# Patient Record
Sex: FEMALE | Race: WHITE | NOT HISPANIC OR LATINO
[De-identification: names, ages, dates, MRNs, and addresses within clinical notes are randomized per-mention and may not be internally consistent; named-entity substitution may affect disease eponyms.]

---

## 2017-01-09 PROBLEM — Z00.00 ENCOUNTER FOR PREVENTIVE HEALTH EXAMINATION: Status: ACTIVE | Noted: 2017-01-09

## 2017-02-07 ENCOUNTER — APPOINTMENT (OUTPATIENT)
Dept: NEUROLOGY | Facility: CLINIC | Age: 27
End: 2017-02-07

## 2017-02-07 VITALS
BODY MASS INDEX: 38.64 KG/M2 | SYSTOLIC BLOOD PRESSURE: 114 MMHG | HEIGHT: 62 IN | DIASTOLIC BLOOD PRESSURE: 79 MMHG | WEIGHT: 210 LBS | HEART RATE: 98 BPM

## 2017-02-07 DIAGNOSIS — Z86.69 PERSONAL HISTORY OF OTHER DISEASES OF THE NERVOUS SYSTEM AND SENSE ORGANS: ICD-10-CM

## 2017-02-07 DIAGNOSIS — Z87.39 PERSONAL HISTORY OF OTHER DISEASES OF THE MUSCULOSKELETAL SYSTEM AND CONNECTIVE TISSUE: ICD-10-CM

## 2017-02-07 DIAGNOSIS — E34.8 OTHER SPECIFIED ENDOCRINE DISORDERS: ICD-10-CM

## 2017-09-29 ENCOUNTER — APPOINTMENT (OUTPATIENT)
Dept: NEUROLOGY | Facility: CLINIC | Age: 27
End: 2017-09-29
Payer: COMMERCIAL

## 2017-09-29 VITALS
HEART RATE: 96 BPM | BODY MASS INDEX: 38.64 KG/M2 | SYSTOLIC BLOOD PRESSURE: 126 MMHG | DIASTOLIC BLOOD PRESSURE: 80 MMHG | WEIGHT: 210 LBS | HEIGHT: 62 IN

## 2017-09-29 DIAGNOSIS — R41.3 OTHER AMNESIA: ICD-10-CM

## 2017-09-29 PROCEDURE — 99214 OFFICE O/P EST MOD 30 MIN: CPT

## 2017-09-29 RX ORDER — ALBUTEROL SULFATE 90 UG/1
108 (90 BASE) AEROSOL, METERED RESPIRATORY (INHALATION)
Qty: 18 | Refills: 0 | Status: DISCONTINUED | COMMUNITY
Start: 2017-06-12

## 2017-09-29 RX ORDER — ALPRAZOLAM 0.5 MG/1
0.5 TABLET ORAL
Qty: 30 | Refills: 0 | Status: DISCONTINUED | COMMUNITY
Start: 2017-07-11

## 2017-09-29 RX ORDER — AMOXICILLIN AND CLAVULANATE POTASSIUM 875; 125 MG/1; MG/1
875-125 TABLET, COATED ORAL
Qty: 14 | Refills: 0 | Status: DISCONTINUED | COMMUNITY
Start: 2017-06-12

## 2017-09-29 RX ORDER — FLUTICASONE PROPIONATE 50 UG/1
50 SPRAY, METERED NASAL
Qty: 16 | Refills: 0 | Status: DISCONTINUED | COMMUNITY
Start: 2017-06-12

## 2017-09-29 RX ORDER — DICLOFENAC SODIUM 50 MG/1
50 TABLET, DELAYED RELEASE ORAL
Qty: 60 | Refills: 0 | Status: DISCONTINUED | COMMUNITY
Start: 2016-12-02

## 2017-09-29 RX ORDER — DOXYCYCLINE 100 MG/1
100 CAPSULE ORAL
Qty: 14 | Refills: 0 | Status: DISCONTINUED | COMMUNITY
Start: 2017-06-20

## 2017-09-29 RX ORDER — PREDNISONE 20 MG/1
20 TABLET ORAL
Qty: 10 | Refills: 0 | Status: DISCONTINUED | COMMUNITY
Start: 2017-06-12

## 2017-09-29 RX ORDER — AMOXICILLIN 875 MG/1
875 TABLET, FILM COATED ORAL
Qty: 20 | Refills: 0 | Status: DISCONTINUED | COMMUNITY
Start: 2017-09-07

## 2017-09-29 RX ORDER — BUDESONIDE 180 UG/1
180 AEROSOL, POWDER RESPIRATORY (INHALATION)
Qty: 1 | Refills: 0 | Status: DISCONTINUED | COMMUNITY
Start: 2017-06-20

## 2017-09-29 RX ORDER — ESCITALOPRAM OXALATE 20 MG/1
20 TABLET ORAL
Qty: 45 | Refills: 0 | Status: DISCONTINUED | COMMUNITY
Start: 2017-07-11

## 2017-11-03 ENCOUNTER — RX RENEWAL (OUTPATIENT)
Age: 27
End: 2017-11-03

## 2017-11-10 ENCOUNTER — APPOINTMENT (OUTPATIENT)
Dept: NEUROLOGY | Facility: CLINIC | Age: 27
End: 2017-11-10

## 2018-08-23 ENCOUNTER — TRANSCRIPTION ENCOUNTER (OUTPATIENT)
Age: 28
End: 2018-08-23

## 2018-08-23 ENCOUNTER — APPOINTMENT (OUTPATIENT)
Dept: NEUROLOGY | Facility: CLINIC | Age: 28
End: 2018-08-23
Payer: COMMERCIAL

## 2018-08-23 VITALS
DIASTOLIC BLOOD PRESSURE: 77 MMHG | WEIGHT: 175 LBS | SYSTOLIC BLOOD PRESSURE: 121 MMHG | BODY MASS INDEX: 32.2 KG/M2 | HEIGHT: 62 IN | HEART RATE: 93 BPM

## 2018-08-23 DIAGNOSIS — M54.12 RADICULOPATHY, CERVICAL REGION: ICD-10-CM

## 2018-08-23 DIAGNOSIS — R20.0 ANESTHESIA OF SKIN: ICD-10-CM

## 2018-08-23 PROCEDURE — 99215 OFFICE O/P EST HI 40 MIN: CPT

## 2018-09-25 ENCOUNTER — APPOINTMENT (OUTPATIENT)
Dept: NEUROLOGY | Facility: CLINIC | Age: 28
End: 2018-09-25
Payer: COMMERCIAL

## 2018-09-25 VITALS
SYSTOLIC BLOOD PRESSURE: 122 MMHG | BODY MASS INDEX: 33.61 KG/M2 | DIASTOLIC BLOOD PRESSURE: 77 MMHG | RESPIRATION RATE: 16 BRPM | HEIGHT: 61 IN | WEIGHT: 178 LBS | OXYGEN SATURATION: 97 % | HEART RATE: 98 BPM

## 2018-09-25 PROCEDURE — 99215 OFFICE O/P EST HI 40 MIN: CPT

## 2018-09-25 RX ORDER — TOPIRAMATE 25 MG/1
25 TABLET, FILM COATED ORAL
Qty: 60 | Refills: 0 | Status: DISCONTINUED | COMMUNITY
Start: 2017-09-29 | End: 2018-09-25

## 2018-09-25 RX ORDER — TOPIRAMATE 25 MG/1
25 TABLET, FILM COATED ORAL
Qty: 30 | Refills: 2 | Status: DISCONTINUED | COMMUNITY
Start: 2017-02-07 | End: 2018-09-25

## 2018-09-25 RX ORDER — ALPRAZOLAM 2 MG/1
TABLET ORAL
Refills: 0 | Status: DISCONTINUED | COMMUNITY
End: 2018-09-25

## 2018-09-25 RX ORDER — ESCITALOPRAM OXALATE 10 MG/1
10 TABLET, FILM COATED ORAL
Refills: 0 | Status: DISCONTINUED | COMMUNITY
End: 2018-09-25

## 2018-09-25 RX ORDER — TRAZODONE HYDROCHLORIDE 100 MG/1
100 TABLET ORAL
Qty: 30 | Refills: 0 | Status: DISCONTINUED | COMMUNITY
Start: 2017-07-11 | End: 2018-09-25

## 2018-09-25 RX ORDER — RIZATRIPTAN BENZOATE 10 MG/1
10 TABLET ORAL
Qty: 10 | Refills: 3 | Status: DISCONTINUED | COMMUNITY
Start: 2018-08-23 | End: 2018-09-25

## 2018-09-25 RX ORDER — NORETHINDRONE ACETATE AND ETHINYL ESTRADIOL AND FERROUS FUMARATE 1.5-30(21)
KIT ORAL
Refills: 0 | Status: DISCONTINUED | COMMUNITY
End: 2018-09-25

## 2018-10-09 ENCOUNTER — OUTPATIENT (OUTPATIENT)
Dept: OUTPATIENT SERVICES | Facility: HOSPITAL | Age: 28
LOS: 1 days | End: 2018-10-09
Payer: COMMERCIAL

## 2018-10-09 VITALS
SYSTOLIC BLOOD PRESSURE: 111 MMHG | HEART RATE: 91 BPM | OXYGEN SATURATION: 97 % | RESPIRATION RATE: 16 BRPM | DIASTOLIC BLOOD PRESSURE: 73 MMHG | HEIGHT: 61 IN | WEIGHT: 223.99 LBS | TEMPERATURE: 98 F

## 2018-10-09 DIAGNOSIS — I67.1 CEREBRAL ANEURYSM, NONRUPTURED: ICD-10-CM

## 2018-10-09 DIAGNOSIS — Z98.890 OTHER SPECIFIED POSTPROCEDURAL STATES: Chronic | ICD-10-CM

## 2018-10-09 DIAGNOSIS — Z01.818 ENCOUNTER FOR OTHER PREPROCEDURAL EXAMINATION: ICD-10-CM

## 2018-10-09 LAB
ANION GAP SERPL CALC-SCNC: 13 MMOL/L — SIGNIFICANT CHANGE UP (ref 5–17)
BLD GP AB SCN SERPL QL: NEGATIVE — SIGNIFICANT CHANGE UP
BUN SERPL-MCNC: 10 MG/DL — SIGNIFICANT CHANGE UP (ref 7–23)
CALCIUM SERPL-MCNC: 9.1 MG/DL — SIGNIFICANT CHANGE UP (ref 8.4–10.5)
CHLORIDE SERPL-SCNC: 106 MMOL/L — SIGNIFICANT CHANGE UP (ref 96–108)
CO2 SERPL-SCNC: 20 MMOL/L — LOW (ref 22–31)
CREAT SERPL-MCNC: 0.64 MG/DL — SIGNIFICANT CHANGE UP (ref 0.5–1.3)
GLUCOSE SERPL-MCNC: 93 MG/DL — SIGNIFICANT CHANGE UP (ref 70–99)
HCT VFR BLD CALC: 41.4 % — SIGNIFICANT CHANGE UP (ref 34.5–45)
HGB BLD-MCNC: 13.9 G/DL — SIGNIFICANT CHANGE UP (ref 11.5–15.5)
MCHC RBC-ENTMCNC: 30 PG — SIGNIFICANT CHANGE UP (ref 27–34)
MCHC RBC-ENTMCNC: 33.6 GM/DL — SIGNIFICANT CHANGE UP (ref 32–36)
MCV RBC AUTO: 89.4 FL — SIGNIFICANT CHANGE UP (ref 80–100)
PLATELET # BLD AUTO: 330 K/UL — SIGNIFICANT CHANGE UP (ref 150–400)
POTASSIUM SERPL-MCNC: 3.7 MMOL/L — SIGNIFICANT CHANGE UP (ref 3.5–5.3)
POTASSIUM SERPL-SCNC: 3.7 MMOL/L — SIGNIFICANT CHANGE UP (ref 3.5–5.3)
RBC # BLD: 4.63 M/UL — SIGNIFICANT CHANGE UP (ref 3.8–5.2)
RBC # FLD: 12.7 % — SIGNIFICANT CHANGE UP (ref 10.3–14.5)
RH IG SCN BLD-IMP: POSITIVE — SIGNIFICANT CHANGE UP
SODIUM SERPL-SCNC: 139 MMOL/L — SIGNIFICANT CHANGE UP (ref 135–145)
WBC # BLD: 8.16 K/UL — SIGNIFICANT CHANGE UP (ref 3.8–10.5)
WBC # FLD AUTO: 8.16 K/UL — SIGNIFICANT CHANGE UP (ref 3.8–10.5)

## 2018-10-09 PROCEDURE — 80048 BASIC METABOLIC PNL TOTAL CA: CPT

## 2018-10-09 PROCEDURE — 86850 RBC ANTIBODY SCREEN: CPT

## 2018-10-09 PROCEDURE — 85027 COMPLETE CBC AUTOMATED: CPT

## 2018-10-09 PROCEDURE — G0463: CPT

## 2018-10-09 PROCEDURE — 86901 BLOOD TYPING SEROLOGIC RH(D): CPT

## 2018-10-09 PROCEDURE — 86900 BLOOD TYPING SEROLOGIC ABO: CPT

## 2018-10-09 RX ORDER — RIZATRIPTAN BENZOATE 5 MG/1
1 TABLET ORAL
Qty: 0 | Refills: 0 | COMMUNITY

## 2018-10-09 RX ORDER — TOPIRAMATE 25 MG
1 TABLET ORAL
Qty: 0 | Refills: 0 | COMMUNITY

## 2018-10-09 NOTE — H&P PST ADULT - PMH
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

## 2018-10-09 NOTE — H&P PST ADULT - PSH
History of strabismus surgery  s/p Multiple eye surgeries: age 1 to age 17 ( secondary to congenital  4th Nerve palsy)  S/P rotator cuff repair  ' 2007  Left

## 2018-10-09 NOTE — H&P PST ADULT - HISTORY OF PRESENT ILLNESS
This is a 29 y/o female with PMH: Morbid obesity: BMI 42.3, Migraine, dx: ('15): Chiari 1 Malformation: No surgery, dx: ('16): Cerbral Aneurysm: No surgery. Routine MRI/MRA ( 8/2018): shows change. Now scheduled: This is a 27 y/o female with PMH: Morbid obesity: BMI 42.3, Migraine, dx: ('15): Chiari 1 Malformation: No surgery, dx: ('16): Cerbral Aneurysm: No surgery. Routine MRI/MRA ( 8/2018): shows change. Now scheduled: Cerebral Angiogram.

## 2018-10-17 ENCOUNTER — FORM ENCOUNTER (OUTPATIENT)
Age: 28
End: 2018-10-17

## 2018-10-18 ENCOUNTER — APPOINTMENT (OUTPATIENT)
Dept: NEUROLOGY | Facility: CLINIC | Age: 28
End: 2018-10-18

## 2018-10-18 ENCOUNTER — OUTPATIENT (OUTPATIENT)
Dept: OUTPATIENT SERVICES | Facility: HOSPITAL | Age: 28
LOS: 1 days | End: 2018-10-18
Payer: COMMERCIAL

## 2018-10-18 DIAGNOSIS — Z98.890 OTHER SPECIFIED POSTPROCEDURAL STATES: Chronic | ICD-10-CM

## 2018-10-18 DIAGNOSIS — I67.1 CEREBRAL ANEURYSM, NONRUPTURED: ICD-10-CM

## 2018-10-18 PROCEDURE — C1769: CPT

## 2018-10-18 PROCEDURE — 36224 PLACE CATH CAROTD ART: CPT

## 2018-10-18 PROCEDURE — 36226 PLACE CATH VERTEBRAL ART: CPT | Mod: 50

## 2018-10-18 PROCEDURE — 76377 3D RENDER W/INTRP POSTPROCES: CPT | Mod: 26

## 2018-10-18 PROCEDURE — 36223 PLACE CATH CAROTID/INOM ART: CPT | Mod: 59

## 2018-10-18 PROCEDURE — 36223 PLACE CATH CAROTID/INOM ART: CPT | Mod: 59,LT

## 2018-10-18 PROCEDURE — 36224 PLACE CATH CAROTD ART: CPT | Mod: RT

## 2018-10-18 PROCEDURE — C1894: CPT

## 2018-10-18 PROCEDURE — 86900 BLOOD TYPING SEROLOGIC ABO: CPT

## 2018-10-18 PROCEDURE — 36226 PLACE CATH VERTEBRAL ART: CPT

## 2018-10-18 PROCEDURE — C1887: CPT

## 2018-10-18 PROCEDURE — 76377 3D RENDER W/INTRP POSTPROCES: CPT

## 2018-10-18 PROCEDURE — 86901 BLOOD TYPING SEROLOGIC RH(D): CPT

## 2018-10-18 RX ORDER — SODIUM CHLORIDE 9 MG/ML
1000 INJECTION INTRAMUSCULAR; INTRAVENOUS; SUBCUTANEOUS
Qty: 0 | Refills: 0 | Status: DISCONTINUED | OUTPATIENT
Start: 2018-10-18 | End: 2018-11-02

## 2018-10-18 NOTE — CHART NOTE - NSCHARTNOTEFT_GEN_A_CORE
Interventional Neuro- Radiology   Procedure Note    Procedure: Selective Cerebral Angiography   Pre- Procedure Diagnosis: family history of aneurysm  Post- Procedure Diagnosis: ACOM region aneurysm    : Dr. Ronal Verdugo  Fellow: Dr. Nella Estrada  Resident: Dr. John Acosta    RN: Sowmya Shetty  RT: Kacie Singh    Anesthesia: (MAC)   (general anesthesia) Debra Gonzalez MD    Sheath: 4F    Preliminary Report:    Using a 4 Fr short sheath to the right groin under MAC sedation via left vertebral artery, left common carotid artery, left external carotid artery, right vertebral artery, right common carotid artery, right external carotid artery a selective cerebral angiography was performed and  demonstrates 1mm ACOM region aneurysm. (Official note to follow).  Patient tolerated procedure well, hemodynamically stable, no change in neurological status compared to baseline. Results discussed with neurosurgery, patient and patient's family. Groin sheath was removed,  manual compression held to hemostasis  for 11 minutes, no active bleeding, no hematoma, quick clot and safeguard balloon dressing applied at 1136h. Patient transferred to Recovery Room Interventional Neuro- Radiology   Procedure Note    Procedure: Selective Cerebral Angiography   Pre- Procedure Diagnosis: family history of aneurysm  Post- Procedure Diagnosis: ACOM region aneurysm    : Dr. Ronal Verdugo  Fellow: Dr. Nella Estrada  Resident: Dr. John Acosta    RN: Sowmya Shetty  RT: Kacie Singh    Anesthesia: (MAC)   (general anesthesia) Debra Gonzalez MD    Sheath: 4F    Preliminary Report:    Using a 4 Fr short sheath to the right groin under MAC sedation via left vertebral artery, left common carotid artery, left external carotid artery, right vertebral artery, right common carotid artery, right external carotid artery a selective cerebral angiography was performed and  demonstrates 1mm ACOM region aneurysm vs infindibulum. (Official note to follow).  Patient tolerated procedure well, hemodynamically stable, no change in neurological status compared to baseline. Results discussed with neurosurgery, patient and patient's family. Groin sheath was removed,  manual compression held to hemostasis  for 11 minutes, no active bleeding, no hematoma, quick clot and safeguard balloon dressing applied at 1136h. Patient transferred to Recovery Room Interventional Neuro- Radiology   Procedure Note    Procedure: Selective Cerebral Angiography   Pre- Procedure Diagnosis: family history of aneurysm  Post- Procedure Diagnosis: very small 1 mm ACOM aneurysm; possible infundibulum.    : Dr. Ronal Verdugo  Fellow: Dr. Nella Estrada  Resident: Dr. John Acosta    RN: Sowmya Shetty  RT: Kacie Singh    Anesthesia: (MAC)   (general anesthesia) Debra Gonzalez MD    Sheath: 4F    Preliminary Report:    Using a 4 Fr short sheath to the right groin under MAC sedation via left vertebral artery, left common carotid artery,, right vertebral artery, right common/internal carotid artery a selective cerebral angiography was performed and  demonstrates 1mm ACOM region aneurysm vs infindibulum. (Official note to follow).  Patient tolerated procedure well, hemodynamically stable, no change in neurological status compared to baseline. Results discussed with neurosurgery, patient and patient's family. Groin sheath was removed,  manual compression held to hemostasis  for 11 minutes, no active bleeding, no hematoma, quick clot and safeguard balloon dressing applied at 1136h. Patient transferred to Recovery Room

## 2018-10-22 ENCOUNTER — APPOINTMENT (OUTPATIENT)
Dept: NEUROLOGY | Facility: CLINIC | Age: 28
End: 2018-10-22
Payer: COMMERCIAL

## 2018-10-22 PROBLEM — G58.9 MONONEUROPATHY, UNSPECIFIED: Chronic | Status: ACTIVE | Noted: 2018-10-09

## 2018-10-22 PROBLEM — H50.9 UNSPECIFIED STRABISMUS: Chronic | Status: ACTIVE | Noted: 2018-10-09

## 2018-10-22 PROBLEM — M43.02 SPONDYLOLYSIS, CERVICAL REGION: Chronic | Status: ACTIVE | Noted: 2018-10-09

## 2018-10-22 PROBLEM — I67.1 CEREBRAL ANEURYSM, NONRUPTURED: Chronic | Status: ACTIVE | Noted: 2018-10-09

## 2018-10-22 PROBLEM — M75.102 UNSPECIFIED ROTATOR CUFF TEAR OR RUPTURE OF LEFT SHOULDER, NOT SPECIFIED AS TRAUMATIC: Chronic | Status: ACTIVE | Noted: 2018-10-09

## 2018-10-22 PROBLEM — G43.909 MIGRAINE, UNSPECIFIED, NOT INTRACTABLE, WITHOUT STATUS MIGRAINOSUS: Chronic | Status: ACTIVE | Noted: 2018-10-09

## 2018-10-22 PROBLEM — G93.5 COMPRESSION OF BRAIN: Chronic | Status: ACTIVE | Noted: 2018-10-09

## 2018-10-22 PROBLEM — R01.1 CARDIAC MURMUR, UNSPECIFIED: Chronic | Status: ACTIVE | Noted: 2018-10-09

## 2018-10-22 PROCEDURE — 95886 MUSC TEST DONE W/N TEST COMP: CPT

## 2018-10-22 PROCEDURE — 95910 NRV CNDJ TEST 7-8 STUDIES: CPT

## 2018-10-23 DIAGNOSIS — I67.9 CEREBROVASCULAR DISEASE, UNSPECIFIED: ICD-10-CM

## 2019-01-22 ENCOUNTER — APPOINTMENT (OUTPATIENT)
Dept: NEUROLOGY | Facility: CLINIC | Age: 29
End: 2019-01-22

## 2019-03-04 PROBLEM — R41.3 MEMORY LOSS: Status: ACTIVE | Noted: 2017-09-29

## 2019-11-27 ENCOUNTER — APPOINTMENT (OUTPATIENT)
Dept: NEUROLOGY | Facility: CLINIC | Age: 29
End: 2019-11-27
Payer: COMMERCIAL

## 2019-11-27 VITALS
SYSTOLIC BLOOD PRESSURE: 122 MMHG | BODY MASS INDEX: 39.65 KG/M2 | HEIGHT: 61 IN | HEART RATE: 88 BPM | WEIGHT: 210 LBS | DIASTOLIC BLOOD PRESSURE: 72 MMHG

## 2019-11-27 PROCEDURE — 99214 OFFICE O/P EST MOD 30 MIN: CPT

## 2019-11-28 NOTE — HISTORY OF PRESENT ILLNESS
[FreeTextEntry1] : 28 yo woman with chronic migraine headaches, Type 1 Chiari malformation, AComm aneurysm, and cervical spondylosis.\par \par She was taking Topamax which was helpful, however, she stopped taking it because she is trying to conceive.  Now her headaches have been chronic daily migrainous headaches.  She prefers to avoid daily medication. \par \par Here for follow up regarding her Chiari malformation and AComm aneurysm.

## 2019-11-28 NOTE — PHYSICAL EXAM
[FreeTextEntry1] : Awake, alert, oriented x 3. Language fluent. Comprehension intact. Naming intact. Repetition intact. Affect normal. Cranial nerves grossly intact. Motor exam: normal bulk, normal tone, 5/5 in all four extremities. No tremors or fasciculations. Sensory exam: intact to LT/PP/VINNIE/vibration. DTRs: 2+ throughout, flexor plantar response bilaterally, no clonus. Coordination: no dysmetria. Gait: normal toe/heel/tandem gait. Romberg - negative

## 2019-11-28 NOTE — ASSESSMENT
[FreeTextEntry1] : 28 yo woman with chronic migraine headaches, Type 1 Chiari malformation, AComm aneurysm, and cervical spondylosis.\par \par Plan:\par 1. Conservative measurse for headache prophylaxis.\par 2. Maxalt 10mg PO at onset of migraine prn\par 3. MRI brain to monitor Chiari malformation\par 4. MRA brain to monitor size of aneurysm\par 5. Patient agrees with plan.\par 6. Follow up in 1 year\par \par Roscoe Zavala MD\par [facility]\par \par Greater than 50% of the encounter was spent on counseling and coordination of care discussing differential diagnosis, diagnostic testing, and  treatment options. We have talked about appropriate follow up, and I have spent 25 minutes of face to face time with the patient.\par

## 2019-11-29 ENCOUNTER — RESULT CHARGE (OUTPATIENT)
Age: 29
End: 2019-11-29

## 2020-11-09 NOTE — CHART NOTE - NSCHARTNOTEFT_GEN_A_CORE
Interventional Neuro Radiology  Pre-Procedure Note         HPI:  Ms. Lombardo is a R handed 28 year women with a past medical history of migraines, type 1 Chiari malformation, cervical spondylosis, and congenital 4th nerve palsy who complained of dizziness, difficulty swallowing at times and LE weakness with a recent fall in 7/2018. She was referred by her neurologist for head MRA on 8/22/018 which shows a possible very small 1 to 2 mm aneurysm at the junction of the right A2 segment and anterior communicating artery. She reports a strong family history of aneurysm and subarachnoid hemorrhage in both her parents. Patient comes for schedule cerebral angiography for appropriate assessment of aneurysms.      NIH Stroke Scale: 0    Allergies: adhesives (Other)  No Known Drug Allergies  Red Dye ( p.o.) (Hives)  strawberry (Hives)      PAST MEDICAL & SURGICAL HISTORY:  Heart murmur: mild  Rotator cuff tear, left: &#x27; 07  surgically treated  Strabismus: Bilateral: surgically treated  Nerve palsy: congenital: 4th Nerve palsy  Cervical spondylolysis  Chiari malformation type I: dx: &#x27; 2015  No surgery  Migraine  Cerebral aneurysm: dx: &#x27; 2016  S/P rotator cuff repair: &#x27; 2007  Left  History of strabismus surgery: s/p Multiple eye surgeries: age 1 to age 17 ( secondary to congenital  4th Nerve palsy)      Social History: Non smoker    FAMILY HISTORY: Cerebral aneurysm    Assessment/Plan:   Ms. Lombardo is a R handed 28 year women with a past medical history of migraines, type 1 Chiari malformation, cervical spondylosis, and congenital 4th nerve palsy who complained of dizziness, difficulty swallowing at times and LE weakness with a recent fall in 7/2018. She was referred by her neurologist for head MRA on 8/22/018 which shows a possible very small 1 to 2 mm aneurysm at the junction of the right A2 segment and anterior communicating artery. She reports a strong family history of aneurysm and subarachnoid hemorrhage in both her parents.   Patient presents to neuro-IR for diagnostic selective cerebral angiography.  Procedure, goals, risks, benefits and alternatives were discussed with patient and patient's family.   Risks include but are not limited to stroke, vessel injury, hemorrhage, groin hematoma.  All questions were answered. Patient  signed appropriate consent and consent is in the chart. No

## 2021-03-30 ENCOUNTER — NON-APPOINTMENT (OUTPATIENT)
Age: 31
End: 2021-03-30

## 2021-03-31 ENCOUNTER — NON-APPOINTMENT (OUTPATIENT)
Age: 31
End: 2021-03-31

## 2021-11-01 ENCOUNTER — APPOINTMENT (OUTPATIENT)
Dept: MRI IMAGING | Facility: CLINIC | Age: 31
End: 2021-11-01

## 2021-11-03 ENCOUNTER — APPOINTMENT (OUTPATIENT)
Dept: MRI IMAGING | Facility: CLINIC | Age: 31
End: 2021-11-03

## 2022-01-29 ENCOUNTER — NON-APPOINTMENT (OUTPATIENT)
Age: 32
End: 2022-01-29

## 2022-01-31 ENCOUNTER — APPOINTMENT (OUTPATIENT)
Dept: NEUROLOGY | Facility: CLINIC | Age: 32
End: 2022-01-31

## 2022-01-31 ENCOUNTER — APPOINTMENT (OUTPATIENT)
Dept: NEUROLOGY | Facility: CLINIC | Age: 32
End: 2022-01-31
Payer: COMMERCIAL

## 2022-01-31 VITALS
DIASTOLIC BLOOD PRESSURE: 79 MMHG | BODY MASS INDEX: 36.44 KG/M2 | HEIGHT: 62 IN | HEART RATE: 105 BPM | SYSTOLIC BLOOD PRESSURE: 128 MMHG | TEMPERATURE: 98.2 F | WEIGHT: 198 LBS | OXYGEN SATURATION: 98 %

## 2022-01-31 PROCEDURE — 99214 OFFICE O/P EST MOD 30 MIN: CPT

## 2022-01-31 RX ORDER — TOPIRAMATE 25 MG/1
25 TABLET, FILM COATED ORAL
Qty: 60 | Refills: 5 | Status: DISCONTINUED | COMMUNITY
Start: 2018-08-23 | End: 2022-01-31

## 2022-01-31 NOTE — HISTORY OF PRESENT ILLNESS
[FreeTextEntry1] : Ms. Lombardo is a 31 year women with a PMHx migraines, type 1 Chiari malformation, cervical spondylosis, and congenital 4th nerve palsy who complained of dizziness, difficulty swallowing at times and LE weakness with fall in 7/2018. She was referred by her neurologist for head MRA on 8/22/018 which showed a possible very small 1 to 2 mm aneurysm at the junction of the right A2 segment and anterior communicating artery. She had a cerebral angiogram performed on 10/18/18 which showed a likely A-comm  infundibulum, outpouching was less likely an aneurysm. MRA was repeated in November 2021 and aneurysm/infundibulum was stable. She reports a strong family history of aneurysm and subarachnoid hemorrhage in both her parents. She experiences migraine headaches 3-4x/week. She denies any other new or concerning neurological symptoms.

## 2022-01-31 NOTE — DISCUSSION/SUMMARY
[FreeTextEntry1] : Ms. Lombardo is a 31 year-old women with a 1 to 2 mm outpouching at the junction of the right A2 segment and anterior communicating artery that may represent a very small aneurysm. She had a cerebral angiogram performed on 10/18/18 which showed a likely A-comm  infundibulum. Repeat MRA performed in 11/2021 showed stability of outpouching. Perform MRA head in three years. TPM restarted for migraine prophylaxis. Follow-up after repeat MRA in 3 years. All of her questions and concerns were addressed. \par \par

## 2022-01-31 NOTE — REVIEW OF SYSTEMS
[Numbness] : numbness [Tingling] : tingling [Dizziness] : dizziness [Vertigo] : vertigo [Migraine Headache] : migraine headaches [Fever] : no fever [Chills] : no chills [Confused or Disoriented] : no confusion [Memory Lapses or Loss] : no memory loss [Decr. Concentrating Ability] : no decrease in concentrating ability [Difficulty with Language] : no ~M difficulty with language [Facial Weakness] : no facial weakness [Arm Weakness] : no arm weakness [Hand Weakness] : no hand weakness [Leg Weakness] : no leg weakness [Difficulty Writing] : no difficulty writing [Difficulties in Speech] : no speech difficulties [Fainting] : no fainting [Lightheadedness] : no lightheadedness [Difficulty Walking] : no difficulty walking [Inability to Walk] : able to walk [Ataxia] : no ataxia [Frequent Falls] : not falling [Anxiety] : no anxiety [Depression] : no depression [Eye Pain] : no eye pain [Eyesight Problems] : no eyesight problems [Loss Of Hearing] : no hearing loss [Chest Pain] : no chest pain [Palpitations] : no palpitations [Shortness Of Breath] : no shortness of breath [de-identified] : Left lower extremity numbness/tingling  [FreeTextEntry3] : Diplopia

## 2022-01-31 NOTE — PHYSICAL EXAM
[FreeTextEntry1] : GENERAL PHYSICAL EXAM:\par GEN: no distress, normal affect\par HEENT: NCAT, OP clear\par EYES: sclera white, conjunctiva clear, no nystagmus\par NECK: supple\par CV: normal rhythm\par PULM: no respiratory distress, normal rhythm and effort\par EXT: no edema, no cyanosis\par MSK: muscle tone and strength normal\par SKIN: warm, dry, no rash or lesion on exposed skin \par \par NEUROLOGICAL EXAM:\par Mental Status\par Orientation: alert and oriented to person, place, time, and situation\par Language: clear and fluent, intact comprehension and repetition\par \par Cranial Nerves\par II: visual fields full to confrontation \par III, IV, VI: PERRL, EOMI\par V, VII: facial sensation and movement intact and symmetric \par VIII: hearing intact \par IX, X: uvula midline, soft palate elevates normally \par XI: BL shoulder shrug intact \par XII: tongue midline\par \par Motor\par Shoulder abd: 5 (R), 5 (L)\par EF/EE: 5 (R), 5 (L)\par hand : 5 (R), 5 (L)\par HF/HE: 5 (R), 5 (L)\par KF/KE: 5 (R), 5 (L)\par DF/PF: 5 (R), 5 (L) \par Tone and bulk are normal in upper and lower limbs\par No pronator drift\par \par Sensation\par Intact to light touch in all 4 EXTs\par \par Reflex\par 2+ in BL biceps, brachioradialis, patella\par \par Coordination\par Normal FTN bilaterally\par Dysdiadochokinesia not present. \par Able to perform rapid, alternating movements\par \par Gait\par Normal stance, stride, and pivot turn\par Tandem walk intact\par Negative Romberg

## 2022-02-01 ENCOUNTER — TRANSCRIPTION ENCOUNTER (OUTPATIENT)
Age: 32
End: 2022-02-01

## 2022-02-02 RX ORDER — TOPIRAMATE 25 MG/1
25 TABLET, FILM COATED ORAL
Qty: 70 | Refills: 0 | Status: ACTIVE | COMMUNITY
Start: 2022-01-31 | End: 1900-01-01

## 2022-03-22 ENCOUNTER — RX RENEWAL (OUTPATIENT)
Age: 32
End: 2022-03-22

## 2022-03-22 RX ORDER — TOPIRAMATE 100 MG/1
100 TABLET, FILM COATED ORAL
Qty: 30 | Refills: 2 | Status: ACTIVE | COMMUNITY
Start: 2022-03-22 | End: 1900-01-01

## 2022-07-11 ENCOUNTER — APPOINTMENT (OUTPATIENT)
Dept: NEUROLOGY | Facility: CLINIC | Age: 32
End: 2022-07-11

## 2022-07-11 ENCOUNTER — NON-APPOINTMENT (OUTPATIENT)
Age: 32
End: 2022-07-11

## 2022-07-11 VITALS
WEIGHT: 181 LBS | BODY MASS INDEX: 33.31 KG/M2 | TEMPERATURE: 97.9 F | SYSTOLIC BLOOD PRESSURE: 120 MMHG | DIASTOLIC BLOOD PRESSURE: 66 MMHG | HEIGHT: 62 IN | OXYGEN SATURATION: 98 % | HEART RATE: 118 BPM

## 2022-07-11 PROCEDURE — 99213 OFFICE O/P EST LOW 20 MIN: CPT

## 2022-07-11 RX ORDER — RIZATRIPTAN BENZOATE 5 MG/1
5 TABLET ORAL
Qty: 16 | Refills: 2 | Status: ACTIVE | COMMUNITY
Start: 2019-11-27 | End: 1900-01-01

## 2022-07-11 NOTE — DISCUSSION/SUMMARY
[FreeTextEntry1] : Ms. Lombardo is a 31 year-old women with PMH with a 1 to 2 mm outpouching at the junction of the right A2 segment and anterior communicating artery that may represent a very small aneurysm. She had a cerebral angiogram performed on 10/18/18 with Dr. Verdugo which showed a likely A-comm  infundibulum. Repeat MRA performed in 11/2021 showed stability of outpouching. Perform MRA head in three years. Continue rizatriptan PRN for migraines. Follow-up with me in 6 months or sooner should the need arise. All of her questions and concerns were addressed. \par \par

## 2022-07-11 NOTE — REVIEW OF SYSTEMS
[Fever] : no fever [Chills] : no chills [Confused or Disoriented] : no confusion [Memory Lapses or Loss] : no memory loss [Decr. Concentrating Ability] : no decrease in concentrating ability [Difficulty with Language] : no ~M difficulty with language [Facial Weakness] : no facial weakness [Arm Weakness] : no arm weakness [Hand Weakness] : no hand weakness [Leg Weakness] : no leg weakness [Difficulty Writing] : no difficulty writing [Difficulties in Speech] : no speech difficulties [Fainting] : no fainting [Lightheadedness] : no lightheadedness [Difficulty Walking] : no difficulty walking [Inability to Walk] : able to walk [Ataxia] : no ataxia [Frequent Falls] : not falling [Anxiety] : no anxiety [Depression] : no depression [Eye Pain] : no eye pain [Eyesight Problems] : no eyesight problems [Loss Of Hearing] : no hearing loss [Chest Pain] : no chest pain [Palpitations] : no palpitations [Shortness Of Breath] : no shortness of breath [de-identified] : Left lower extremity numbness/tingling  [FreeTextEntry3] : Diplopia

## 2022-07-11 NOTE — HISTORY OF PRESENT ILLNESS
[FreeTextEntry1] : Ms. Lombardo is a 31 year women with a PMHx migraines, type 1 Chiari malformation, cervical spondylosis, and congenital 4th nerve palsy who complained of dizziness, difficulty swallowing at times and LE weakness with fall in 7/2018. She was referred by her neurologist for head MRA on 8/22/018 which showed a possible very small 1 to 2 mm aneurysm at the junction of the right A2 segment and anterior communicating artery. She had a cerebral angiogram performed on 10/18/18 which showed a likely A-comm  infundibulum, outpouching was less likely an aneurysm. MRA was repeated in November 2021 and aneurysm/infundibulum was stable. TPM was restarted in January 2022 for migraine prophylaxis but she has since discontinued it due to side effects (visual changes). She reports 1-2 migraines a week relieved with rizatriptan. She experiences daily or almost daily headaches which she states are generally tolerable and occasionally takes Tylenol. She denies any other new or concerning neurological symptoms.

## 2022-07-11 NOTE — PHYSICAL EXAM
[FreeTextEntry1] : GENERAL PHYSICAL EXAM:\par GEN: no distress, normal affect\par HEENT: NCAT, OP clear\par EYES: sclera white, conjunctiva clear, no nystagmus\par NECK: supple\par CV: normal rhythm\par PULM: no respiratory distress, normal rhythm and effort\par EXT: no edema, no cyanosis\par MSK: muscle tone and strength normal\par SKIN: warm, dry, no rash or lesion on exposed skin \par \par NEUROLOGICAL EXAM:\par Mental Status\par Orientation: alert and oriented to person, place, time, and situation\par Language: clear and fluent, intact comprehension and repetition\par \par Cranial Nerves\par II: visual fields full to confrontation \par III, IV, VI: PERRL, EOMI\par V, VII: facial sensation and movement intact and symmetric \par VIII: hearing intact \par IX, X: uvula midline, soft palate elevates normally \par XI: BL shoulder shrug intact \par XII: tongue midline\par \par Motor\par Shoulder abd: 5 (R), 5 (L)\par EF/EE: 5 (R), 5 (L)\par hand : 5 (R), 5 (L)\par HF/HE: 5 (R), 5 (L)\par KF/KE: 5 (R), 5 (L)\par DF/PF: 5 (R), 5 (L) \par Tone and bulk are normal in upper and lower limbs\par No pronator drift\par \par Sensation\par Intact to light touch in all 4 EXTs\par \par Reflex\par 2+ in BL biceps, brachioradialis, patella\par \par Coordination\par Normal FTN bilaterally\par Dysdiadochokinesia not present. \par Able to perform rapid, alternating movements\par \par Gait\par Normal stance, stride, and pivot turn\par Tandem walk intact\par Negative Romberg 
0

## 2022-07-22 ENCOUNTER — APPOINTMENT (OUTPATIENT)
Dept: RHEUMATOLOGY | Facility: CLINIC | Age: 32
End: 2022-07-22

## 2022-07-22 VITALS
DIASTOLIC BLOOD PRESSURE: 76 MMHG | OXYGEN SATURATION: 100 % | HEIGHT: 62 IN | BODY MASS INDEX: 33.31 KG/M2 | WEIGHT: 181 LBS | TEMPERATURE: 97.7 F | SYSTOLIC BLOOD PRESSURE: 108 MMHG | HEART RATE: 93 BPM

## 2022-07-22 DIAGNOSIS — H93.19 TINNITUS, UNSPECIFIED EAR: ICD-10-CM

## 2022-07-22 PROCEDURE — 99204 OFFICE O/P NEW MOD 45 MIN: CPT

## 2022-07-22 NOTE — ASSESSMENT
[FreeTextEntry1] : 31 y /o female w/ migraines, Type 1 Chiari malformation, cervical spondylosis referred to rheumatology for joint pains and swelling. \par Pt also reports b/l hearing loss and has been following with ENT.\par Pt had epidural with birth of her child last year, and had tinnitis since then.\par Pt has been followed by neurology for migraines.\par Pt has recurrent swelling/redness of hands, wrists, knees, ankles with fatigue.\par Pt was seen by rheumatologist ~8-9 years ago and told that she had RA. Pt was started on MTX for several months but stopped the medication and has not followed up with rheumatology since then.\par Pt has not had any recent imaging of any joint.\par Pt takes naproxen PRN for the pains.\par Reports frequent nausea with vomiting associated with migraines. Reports psoriasis since childhood of face, arms, legs. Pt has steroid creams for psoriasis. Denies nail changes, uveitis, dactylitis. Pt follows with ophthalmologist due to 4th nerve palsy.\par Dry eyes, dry mouth. 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 Pt would need to be evaluated for RA (and other autoimmune rheum diseases) and if diagnosis of RA, pt would need to be treated with long term medications to prevent flares, long term joint damage, and chronic inflammation.\par Pt has not ruled out pregnancy in the future.\par It is not clear at this time if pt's hearing loss is autoimmune mediated. Treatment of underlying RA and if concern for acute hearing damage, high dose steroids may be considered.\par \par - Obtain labwork to evaluate for RA, other autoimmune diseases (including APLS given 7 miscarriages), and medication safety\par - Obtain XR of b/l hands, wrists, knees, ankles to evaluate for erosions\par - Discussed briefly about medication choices for RA. Given that pt has not ruled out pregnancy, options for treatment at this time would be HCQ, SSZ, and TNFi.\par - Can use NSAIDs or consider PDN bursts for RA flares\par - RTC 1 month for follow up\par

## 2022-07-22 NOTE — HISTORY OF PRESENT ILLNESS
[FreeTextEntry1] : 31 y /o female w/ migraines, Type 1 Chiari malformation, cervical spondylosis referred to rheumatology for joint pains and swelling. \par Pt also reports b/l hearing loss and has been following with ENT.\par Pt had epidural with birth of her child last year, and had tinnitis since then.\par Pt has been followed by neurology for migraines.\par Pt has recurrent swelling/redness of hands, wrists, knees, ankles with fatigue.\par Pt was seen by rheumatologist ~8-9 years ago and told that she had RA. Pt was started on MTX for several months but stopped the medication and has not followed up with rheumatology since then.\par Pt has not had any recent imaging of any joint.\par Pt takes naproxen PRN for the pains.\par \par Reports frequent nausea with vomiting associated with migraines. Reports psoriasis since childhood of face, arms, legs. Pt has steroid creams for psoriasis. Denies nail changes, uveitis, dactylitis. Pt follows with ophthalmologist due to 4th nerve palsy.\par Dry eyes, dry mouth. 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

## 2022-08-29 ENCOUNTER — APPOINTMENT (OUTPATIENT)
Dept: RHEUMATOLOGY | Facility: CLINIC | Age: 32
End: 2022-08-29

## 2022-08-29 VITALS
SYSTOLIC BLOOD PRESSURE: 105 MMHG | TEMPERATURE: 97 F | BODY MASS INDEX: 33.31 KG/M2 | HEIGHT: 62 IN | OXYGEN SATURATION: 100 % | WEIGHT: 181 LBS | DIASTOLIC BLOOD PRESSURE: 71 MMHG | RESPIRATION RATE: 18 BRPM | HEART RATE: 88 BPM

## 2022-08-29 DIAGNOSIS — R20.0 ANESTHESIA OF SKIN: ICD-10-CM

## 2022-08-29 DIAGNOSIS — R20.2 ANESTHESIA OF SKIN: ICD-10-CM

## 2022-08-29 PROCEDURE — 99214 OFFICE O/P EST MOD 30 MIN: CPT

## 2022-08-29 NOTE — HISTORY OF PRESENT ILLNESS
[FreeTextEntry1] : 32 y /o female w/ migraines, Type 1 Chiari malformation, cervical spondylosis presents as follow up for joint pains and swelling.  \par Pt also reports b/l hearing loss and has been following with ENT. \par Pt had epidural with birth of her child last year, and had tinnitus since then. \par Pt has been followed by neurology for migraines. \par Pt has recurrent swelling/redness of hands, wrists, knees, ankles with fatigue. \par Pt was seen by rheumatologist ~8-9 years ago and told that she had RA. Pt was started on MTX for several months but stopped the medication and has not followed up with rheumatology since then. \par Pt has not had any recent imaging of any joint. \par Pt takes naproxen PRN for the pains. \par Reports frequent nausea with vomiting associated with migraines. Reports psoriasis since childhood of face, arms, legs. Pt has steroid creams for psoriasis. Denies nail changes, uveitis, dactylitis. Pt follows with ophthalmologist due to 4th nerve palsy. \par Dry eyes, dry mouth. 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 \par Mother - SLE, ?MS, Sister - MS, maternal grandmother - SLE \par \par INTERVAL HISTORY: \par Pt reports that since last time, pt had an episode of hand swelling/redness/warmth that resolved by itself. Denies any other pains or symptoms. Pt here for follow up of labwork.\par \par WORKUP: \par Remarkable for (8/2022): ESR 28 \par Normal/neg (8/2022): CBC, CMP, CRP, MARGARITO, SSA, SSB, Smith, RNP, C3, C4, Thyroid Ab, APLS labs, ANCAs, RF, CCP, HLA-B27, Lyme, Hep B/C, Quantiferon TB\par XR b/l hands/wrists/knees/ankles (8/2022): Tiny plantar spur of L foot\par

## 2022-08-29 NOTE — ASSESSMENT
[FreeTextEntry1] : 32 y /o female w/ migraines, Type 1 Chiari malformation, cervical spondylosis presents as follow up for joint pains and swelling.  \par Pt also reports b/l hearing loss and has been following with ENT. \par Pt had epidural with birth of her child last year, and had tinnitus since then. \par Pt has been followed by neurology for migraines. \par Pt has recurrent swelling/redness of hands, wrists, knees, ankles with fatigue. \par Pt was seen by rheumatologist ~8-9 years ago and told that she had RA. Pt was started on MTX for several months but stopped the medication and has not followed up with rheumatology since then. \par Pt has not had any recent imaging of any joint. \par Pt takes naproxen PRN for the pains. \par Reports frequent nausea with vomiting associated with migraines. Reports psoriasis since childhood of face, arms, legs. Pt has steroid creams for psoriasis. Denies nail changes, uveitis, dactylitis. Pt follows with ophthalmologist due to 4th nerve palsy. \par Dry eyes, dry mouth. 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 \par Mother - SLE, ?MS, Sister - MS, maternal grandmother - SLE \par \par Patient has clear recurrent inflammatory arthritis by history with prior diagnosis of RA. Labwork by me with mild elevated ESR, normal/neg CRP, RF, CCP, MARGARITO, MARGARITO subserologies, HLA-B27. XRs by me with no joint erosions.\par At this time, based on pt's clear recurrent inflammatory arthritis without lab markers, I will diagnose pt with seronegative RA.\par Pt has not ruled out pregnancy in the future. \par \par - Rx HCQ 200mg BID. HCQ dosage is <5mg/kg/day or <400mg/day to minimize risk of retinal toxicity.\par Side effects of HCQ were discussed with the patient including but not limited to GI upset, retinal toxicity, QT prolongation, cytopenias, myopathy. HCQ has long term data showing safety in pregnancy and breastfeeding. Discussed the importance of yearly ophthalmology evaluation.\par - Pt follows with ophtho regularly already. Pt has follow up in 12/2022 and advised to let them know that she is on HCQ if she is continuing on it by then.\par - Other options for treatment in the future in setting of possible pregnancy in the future include SSZ, and TNFi. \par - Can use NSAIDs or consider PDN bursts for RA flares \par - RTC 3 months for follow up \par

## 2022-10-27 RX ORDER — PREDNISONE 10 MG/1
10 TABLET ORAL
Qty: 30 | Refills: 0 | Status: ACTIVE | COMMUNITY
Start: 2022-10-27 | End: 1900-01-01

## 2022-10-27 RX ORDER — SULFASALAZINE 500 MG/1
500 TABLET ORAL TWICE DAILY
Qty: 60 | Refills: 2 | Status: ACTIVE | COMMUNITY
Start: 2022-10-27 | End: 1900-01-01

## 2022-11-12 ENCOUNTER — RX RENEWAL (OUTPATIENT)
Age: 32
End: 2022-11-12

## 2022-11-12 RX ORDER — HYDROXYCHLOROQUINE SULFATE 200 MG/1
200 TABLET, FILM COATED ORAL TWICE DAILY
Qty: 180 | Refills: 1 | Status: ACTIVE | COMMUNITY
Start: 2022-08-29 | End: 1900-01-01

## 2022-11-28 ENCOUNTER — APPOINTMENT (OUTPATIENT)
Dept: RHEUMATOLOGY | Facility: CLINIC | Age: 32
End: 2022-11-28

## 2022-11-28 DIAGNOSIS — Z79.899 OTHER LONG TERM (CURRENT) DRUG THERAPY: ICD-10-CM

## 2022-11-28 DIAGNOSIS — M25.50 PAIN IN UNSPECIFIED JOINT: ICD-10-CM

## 2022-11-28 DIAGNOSIS — M06.9 RHEUMATOID ARTHRITIS, UNSPECIFIED: ICD-10-CM

## 2022-11-28 PROCEDURE — 99214 OFFICE O/P EST MOD 30 MIN: CPT

## 2022-11-28 NOTE — ASSESSMENT
[FreeTextEntry1] : 33 y/o female w/ migraines, Type 1 Chiari malformation, cervical spondylosis presents as follow up for seronegative RA.  \par Pt also reports b/l hearing loss and has been following with ENT.  \par Pt had epidural with birth of her child last year, and had tinnitus since then.  \par Pt has been followed by neurology for migraines.  \par Pt has recurrent swelling/redness of hands, wrists, knees, ankles with fatigue.  \par Pt was seen by rheumatologist ~8-9 years ago and told that she had RA. Pt was started on MTX for several months but stopped the medication and has not followed up with rheumatology since then.  \par Pt has not had any recent imaging of any joint.  \par Pt takes naproxen PRN for the pains.  \par Reports frequent nausea with vomiting associated with migraines. Reports psoriasis since childhood of face, arms, legs. Pt has steroid creams for psoriasis. Denies nail changes, uveitis, dactylitis. Pt follows with ophthalmologist due to 4th nerve palsy.  \par Dry eyes, dry mouth. 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  \par Mother - SLE, ?MS, Sister - MS, maternal grandmother - SLE  \par \par Patient has clear recurrent inflammatory arthritis by history with prior diagnosis of RA. Labwork by me with mild elevated ESR, normal/neg CRP, RF, CCP, MARGARITO, MARGARITO subserologies, HLA-B27. XRs by me with no joint erosions. \par At this time, based on pt's clear recurrent inflammatory arthritis without lab markers, I will diagnose pt with seronegative RA. \par Pt has not ruled out pregnancy in the future. \par \par Pt was started on HCQ by me in 8/2022. SSZ 500mg PO BID added on 10/2022 for continuing episodes of inflammatory arthritis with improvement by ~50% so far (but it has been only 1 month so far).\par \par - Obtain labwork for medication safety and disease activity\par - c/w SSZ 500mg PO BID. SSZ can be increased up to 4g/day. Side effects of SSZ were discussed with the patient in detail including but not limited to GI upset, hepatotoxicity, nephrotoxicity, pancreatitis, agranulocytosis, cytopenias, and crystalluria.\par This is a high-risk therapy requiring intense monitoring for toxicity (at least every 3 months)\par - c/w HCQ 200mg BID. HCQ dosage is <5mg/kg/day or <400mg/day to minimize risk of retinal toxicity. \par Side effects of HCQ were discussed with the patient including but not limited to GI upset, retinal toxicity, QT prolongation, cytopenias, myopathy. HCQ has long term data showing safety in pregnancy and breastfeeding.\par - Pt follows with ophtho regularly already. Pt has follow up in 12/2022 and advised to let them know that she is on HCQ if she is continuing on it by then.\par - HCQ and SSZ are strongly recommended by ACR guidelines to be continued for pregnancy.\par - Other options for treatment in the future in setting of possible pregnancy in the future include TNFi.\par - Can use NSAIDs or consider PDN bursts for RA flares. Pt to call for major flares.\par - RTC 3 months for follow up \par

## 2022-11-28 NOTE — HISTORY OF PRESENT ILLNESS
[FreeTextEntry1] : HISTORY: \par 31 y/o female w/ migraines, Type 1 Chiari malformation, cervical spondylosis presents as follow up for seronegative RA.  \par Pt also reports b/l hearing loss and has been following with ENT.  \par Pt had epidural with birth of her child last year, and had tinnitus since then.  \par Pt has been followed by neurology for migraines.  \par Pt has recurrent swelling/redness of hands, wrists, knees, ankles with fatigue.  \par Pt was seen by rheumatologist ~8-9 years ago and told that she had RA. Pt was started on MTX for several months but stopped the medication and has not followed up with rheumatology since then.  \par Pt has not had any recent imaging of any joint.  \par Pt takes naproxen PRN for the pains.  \par Reports frequent nausea with vomiting associated with migraines. Reports psoriasis since childhood of face, arms, legs. Pt has steroid creams for psoriasis. Denies nail changes, uveitis, dactylitis. Pt follows with ophthalmologist due to 4th nerve palsy.  \par Dry eyes, dry mouth. 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  \par Mother - SLE, ?MS, Sister - MS, maternal grandmother - SLE  \par \par Patient has clear recurrent inflammatory arthritis by history with prior diagnosis of RA. Labwork by me with mild elevated ESR, normal/neg CRP, RF, CCP, MARGARITO, MARGARITO subserologies, HLA-B27. XRs by me with no joint erosions. \par At this time, based on pt's clear recurrent inflammatory arthritis without lab markers, I will diagnose pt with seronegative RA. \par Pt has not ruled out pregnancy in the future. \par \par INTERVAL HISTORY: \par Pt was started on HCQ by me in 8/2022. SSZ 500mg PO BID added on 10/2022 for continuing episodes of inflammatory arthritis. Pt reports that her symptoms improved by about 50% with the medications but still has pains and flares.\par \par WORKUP:  \par Remarkable for (8/2022): ESR 28  \par Normal/neg (8/2022): CBC, CMP, CRP, MARGARITO, SSA, SSB, Smith, RNP, C3, C4, Thyroid Ab, APLS labs, ANCAs, RF, CCP, HLA-B27, Lyme, Hep B/C, Quantiferon TB \par XR b/l hands/wrists/knees/ankles (8/2022): Tiny plantar spur of L foot\par

## 2023-01-11 ENCOUNTER — APPOINTMENT (OUTPATIENT)
Dept: NEUROLOGY | Facility: CLINIC | Age: 33
End: 2023-01-11
Payer: COMMERCIAL

## 2023-01-11 PROCEDURE — 99213 OFFICE O/P EST LOW 20 MIN: CPT | Mod: 95

## 2023-01-11 RX ORDER — RIBOFLAVIN (VITAMIN B2) 400 MG
400 TABLET ORAL
Qty: 30 | Refills: 1 | Status: ACTIVE | COMMUNITY
Start: 2023-01-11 | End: 1900-01-01

## 2023-01-11 NOTE — DISCUSSION/SUMMARY
[FreeTextEntry1] : Ms. Lombardo is a 32 year-old women with PMH migraines, 1 to 2 mm outpouching at the junction of the right A2 segment and anterior communicating artery who presented for an appointment via Telehealth for follow-up of headaches. Headache characteristic have recently changed as well as increased in frequency and severity. Plan to perform repeat MRI and MRA brain now to assess for vascular malformation, mass or hemorrhage. We discussed treatment options of abortive therapy and preventative therapy. ECG ordered. If QTC is WDL, will begin amitriptyline 10mg as directed. She will begin Ubrelvy 100mg PRN, riboflavin and continue rizatriptan 10mg PRN. We addressed the importance of limiting abortive treatments to prevent medication overuse headaches.  Follow-up with me in 3 months or sooner should the need arise. All of her questions and concerns were addressed. \par \par

## 2023-01-11 NOTE — REVIEW OF SYSTEMS
[Numbness] : numbness [Tingling] : tingling [Dizziness] : dizziness [Vertigo] : vertigo [Migraine Headache] : migraine headaches [Fever] : no fever [Chills] : no chills [Confused or Disoriented] : no confusion [Memory Lapses or Loss] : no memory loss [Decr. Concentrating Ability] : no decrease in concentrating ability [Difficulty with Language] : no ~M difficulty with language [Facial Weakness] : no facial weakness [Arm Weakness] : no arm weakness [Hand Weakness] : no hand weakness [Leg Weakness] : no leg weakness [Difficulty Writing] : no difficulty writing [Difficulties in Speech] : no speech difficulties [Fainting] : no fainting [Lightheadedness] : no lightheadedness [Difficulty Walking] : no difficulty walking [Inability to Walk] : able to walk [Ataxia] : no ataxia [Frequent Falls] : not falling [Anxiety] : no anxiety [Depression] : no depression [Eye Pain] : no eye pain [Eyesight Problems] : no eyesight problems [Loss Of Hearing] : no hearing loss [Chest Pain] : no chest pain [Palpitations] : no palpitations [Shortness Of Breath] : no shortness of breath [de-identified] : Left lower extremity numbness/tingling  [FreeTextEntry3] : Diplopia

## 2023-01-11 NOTE — HISTORY OF PRESENT ILLNESS
[Home] : at home, [unfilled] , at the time of the visit. [Medical Office: ([facility])___] : at the medical office located in  [Verbal consent obtained from patient] : the patient, [unfilled] [FreeTextEntry1] : Ms. 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 She presents today via Telehealth for follow-up of headaches. She reports daily or almost daily headaches relived with rizatriptan. She admits to frequent rizatriptan use, sometimes requiring medication 3x/day. She reports new onset headache towards the back of her head with associated bump on the right posterior head. She states that bump is mobile and tender to touch. She started taking magnesium supplementation a few weeks ago. Her her partner are trying to conceive. She has no further complaints.

## 2023-01-13 RX ORDER — UBROGEPANT 100 MG/1
100 TABLET ORAL
Qty: 16 | Refills: 1 | Status: ACTIVE | COMMUNITY
Start: 2023-01-11 | End: 1900-01-01

## 2023-02-27 ENCOUNTER — APPOINTMENT (OUTPATIENT)
Dept: RHEUMATOLOGY | Facility: CLINIC | Age: 33
End: 2023-02-27

## 2023-03-29 ENCOUNTER — OUTPATIENT (OUTPATIENT)
Dept: OUTPATIENT SERVICES | Facility: HOSPITAL | Age: 33
LOS: 1 days | End: 2023-03-29
Payer: COMMERCIAL

## 2023-03-29 ENCOUNTER — APPOINTMENT (OUTPATIENT)
Dept: MRI IMAGING | Facility: CLINIC | Age: 33
End: 2023-03-29
Payer: COMMERCIAL

## 2023-03-29 DIAGNOSIS — I67.1 CEREBRAL ANEURYSM, NONRUPTURED: ICD-10-CM

## 2023-03-29 DIAGNOSIS — Z98.890 OTHER SPECIFIED POSTPROCEDURAL STATES: Chronic | ICD-10-CM

## 2023-03-29 PROCEDURE — 70544 MR ANGIOGRAPHY HEAD W/O DYE: CPT | Mod: 26,59

## 2023-03-29 PROCEDURE — 70544 MR ANGIOGRAPHY HEAD W/O DYE: CPT

## 2023-03-29 PROCEDURE — 70551 MRI BRAIN STEM W/O DYE: CPT

## 2023-03-29 PROCEDURE — 70551 MRI BRAIN STEM W/O DYE: CPT | Mod: 26

## 2024-02-14 ENCOUNTER — NON-APPOINTMENT (OUTPATIENT)
Age: 34
End: 2024-02-14

## 2024-02-23 ENCOUNTER — APPOINTMENT (OUTPATIENT)
Dept: NEUROLOGY | Facility: CLINIC | Age: 34
End: 2024-02-23
Payer: COMMERCIAL

## 2024-02-23 VITALS — DIASTOLIC BLOOD PRESSURE: 76 MMHG | OXYGEN SATURATION: 98 % | SYSTOLIC BLOOD PRESSURE: 111 MMHG | HEART RATE: 89 BPM

## 2024-02-23 DIAGNOSIS — I67.1 CEREBRAL ANEURYSM, NONRUPTURED: ICD-10-CM

## 2024-02-23 DIAGNOSIS — R51.9 HEADACHE, UNSPECIFIED: ICD-10-CM

## 2024-02-23 PROCEDURE — 99214 OFFICE O/P EST MOD 30 MIN: CPT

## 2024-02-23 RX ORDER — UBROGEPANT 100 MG/1
100 TABLET ORAL
Qty: 1 | Refills: 3 | Status: ACTIVE | COMMUNITY
Start: 2024-02-23 | End: 1900-01-01

## 2024-02-23 NOTE — ASSESSMENT
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 neuro exam nf. will obtain updated imaging and plan to start preventive. she is no longer persuing pregnancy Emglaity 120 x2 X1 then 120mg monthly  Ubrevly prn for abortive phone contact in the interim

## 2024-02-23 NOTE — PHYSICAL EXAM
[General Appearance - In No Acute Distress] : in no acute distress [Affect] : the affect was normal [Mood] : the mood was normal [Person] : oriented to person [Place] : oriented to place [Time] : oriented to time [Short Term Intact] : short term memory intact [Current Events] : adequate knowledge of current events [Cranial Nerves Optic (II)] : visual acuity intact bilaterally,  visual fields full to confrontation, pupils equal round and reactive to light [Cranial Nerves Trigeminal (V)] : facial sensation intact symmetrically [Cranial Nerves Facial (VII)] : face symmetrical [Cranial Nerves Glossopharyngeal (IX)] : tongue and palate midline [Cranial Nerves Accessory (XI - Cranial And Spinal)] : head turning and shoulder shrug symmetric [Cranial Nerves Hypoglossal (XII)] : there was no tongue deviation with protrusion [Motor Strength] : muscle strength was normal in all four extremities [] : no respiratory distress [Involuntary Movements] : no involuntary movements were seen

## 2024-02-23 NOTE — HISTORY OF PRESENT ILLNESS
[FreeTextEntry1] : Ms. 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  Was previous seen by Salima Wright she reports since last seen she continues  to have HAs- has been more frequent, 4/5x/week. with various intensity.  same quality. a/w light sand sound sensitivity not on any preventive or any abortive. Tylenol not effective. no longer planning IVF   here for her annual scan, concerned as her frequency is more and had made lifestyle changes with no changes to her HA. She has tried and failed multiple medications and has restarted magnesium and vitamin B2 the past few weeks.  Meds tried: Topamax Ubrevly  mg ribo nortriptyline amitriptyline propranolol

## 2024-03-13 ENCOUNTER — OUTPATIENT (OUTPATIENT)
Dept: OUTPATIENT SERVICES | Facility: HOSPITAL | Age: 34
LOS: 1 days | End: 2024-03-13
Payer: COMMERCIAL

## 2024-03-13 ENCOUNTER — APPOINTMENT (OUTPATIENT)
Dept: MRI IMAGING | Facility: CLINIC | Age: 34
End: 2024-03-13
Payer: COMMERCIAL

## 2024-03-13 DIAGNOSIS — I67.1 CEREBRAL ANEURYSM, NONRUPTURED: ICD-10-CM

## 2024-03-13 DIAGNOSIS — Z98.890 OTHER SPECIFIED POSTPROCEDURAL STATES: Chronic | ICD-10-CM

## 2024-03-13 DIAGNOSIS — Z00.8 ENCOUNTER FOR OTHER GENERAL EXAMINATION: ICD-10-CM

## 2024-03-13 PROCEDURE — 70544 MR ANGIOGRAPHY HEAD W/O DYE: CPT | Mod: 26,59

## 2024-03-13 PROCEDURE — 70553 MRI BRAIN STEM W/O & W/DYE: CPT | Mod: 26

## 2024-03-13 PROCEDURE — 70544 MR ANGIOGRAPHY HEAD W/O DYE: CPT

## 2024-03-13 PROCEDURE — A9585: CPT

## 2024-03-13 PROCEDURE — 70553 MRI BRAIN STEM W/O & W/DYE: CPT

## 2024-05-24 ENCOUNTER — APPOINTMENT (OUTPATIENT)
Dept: NEUROLOGY | Facility: CLINIC | Age: 34
End: 2024-05-24
Payer: COMMERCIAL

## 2024-05-24 VITALS
DIASTOLIC BLOOD PRESSURE: 47 MMHG | OXYGEN SATURATION: 98 % | SYSTOLIC BLOOD PRESSURE: 107 MMHG | WEIGHT: 186 LBS | BODY MASS INDEX: 34.23 KG/M2 | HEIGHT: 62 IN | HEART RATE: 92 BPM

## 2024-05-24 DIAGNOSIS — G43.709 CHRONIC MIGRAINE W/OUT AURA, NOT INTRACTABLE, W/OUT STATUS MIGRAINOSUS: ICD-10-CM

## 2024-05-24 DIAGNOSIS — R51.9 HEADACHE, UNSPECIFIED: ICD-10-CM

## 2024-05-24 DIAGNOSIS — M54.2 CERVICALGIA: ICD-10-CM

## 2024-05-24 PROCEDURE — 99214 OFFICE O/P EST MOD 30 MIN: CPT

## 2024-05-24 PROCEDURE — G2211 COMPLEX E/M VISIT ADD ON: CPT

## 2024-05-24 RX ORDER — GALCANEZUMAB 120 MG/ML
120 INJECTION, SOLUTION SUBCUTANEOUS
Qty: 2 | Refills: 0 | Status: ACTIVE | COMMUNITY
Start: 2024-02-23 | End: 1900-01-01

## 2024-05-24 RX ORDER — GALCANEZUMAB 120 MG/ML
120 INJECTION, SOLUTION SUBCUTANEOUS
Qty: 1 | Refills: 5 | Status: ACTIVE | COMMUNITY
Start: 2024-02-23 | End: 1900-01-01

## 2024-05-24 NOTE — ASSESSMENT
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  Completed brain imaging with no interval changes.  She started loading dose of Emgality and had no headaches however was not able to continue with the maintenance dose and headaches have now returned to her same quality and increased frequency.  Also with skill skeletal neck pain  -Will restart Emgality loading dose followed by maintenance dose monthly  -Referral to physical therapy to assess for muscle skeletal neck pain1  Emglaity 120 x2  then 120mg monthly Ubrevly prn for abortive phone contact in the interim. fov3 mth

## 2024-05-24 NOTE — PHYSICAL EXAM
[General Appearance - In No Acute Distress] : in no acute distress [Affect] : the affect was normal [Mood] : the mood was normal [Over the Past 2 Weeks, Have You Felt Down, Depressed, or Hopeless?] : 1.) Over the past 2 weeks, have you felt down, depressed, or hopeless? No [Over the Past 2 Weeks, Have You Felt Little Interest or Pleasure Doing Things?] : 2.) Over the past 2 weeks, have you felt little interest or pleasure doing things? No [Person] : oriented to person [Place] : oriented to place [Time] : oriented to time [Short Term Intact] : short term memory intact [Current Events] : adequate knowledge of current events [Cranial Nerves Optic (II)] : visual acuity intact bilaterally,  visual fields full to confrontation, pupils equal round and reactive to light [Cranial Nerves Trigeminal (V)] : facial sensation intact symmetrically [Cranial Nerves Facial (VII)] : face symmetrical [Cranial Nerves Glossopharyngeal (IX)] : tongue and palate midline [Cranial Nerves Accessory (XI - Cranial And Spinal)] : head turning and shoulder shrug symmetric [Cranial Nerves Hypoglossal (XII)] : there was no tongue deviation with protrusion [Motor Strength] : muscle strength was normal in all four extremities [Motor Handedness Right-Handed] : the patient is right hand dominant [Abnormal Walk] : normal gait [Balance] : balance was intact [2+] : Patella left 2+ [PERRL With Normal Accommodation] : pupils were equal in size, round, reactive to light, with normal accommodation [Involuntary Movements] : no involuntary movements were seen [FreeTextEntry1] : b/l trap msk ttp, restricted rom in neck  occip.notch ttp

## 2024-05-24 NOTE — HISTORY OF PRESENT ILLNESS
[FreeTextEntry1] : **Interval 5.24.24: got loading dose of EMgality. HAs went from daily to none. did not get the maintenance dose and HAs are back to daily. no changes in quality.  wants to retry. neck pain is daily. no radiation of pain    INITIAL 2.3.24 Ms. 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  Was previous seen by Salima Wright she reports since last seen she continues  to have HAs- has been more frequent, 4/5x/week. with various intensity.  same quality. a/w light sand sound sensitivity not on any preventive or any abortive. Tylenol not effective. no longer planning IVF   here for her annual scan, concerned as her frequency is more and had made lifestyle changes with no changes to her HA. She has tried and failed multiple medications and has restarted magnesium and vitamin B2 the past few weeks.  Meds tried: Topamax Ubrevly  mg ribo nortriptyline amitriptyline propranolol

## 2024-08-30 ENCOUNTER — APPOINTMENT (OUTPATIENT)
Dept: NEUROLOGY | Facility: CLINIC | Age: 34
End: 2024-08-30
Payer: COMMERCIAL

## 2024-08-30 VITALS
SYSTOLIC BLOOD PRESSURE: 118 MMHG | OXYGEN SATURATION: 98 % | HEIGHT: 62 IN | WEIGHT: 186 LBS | DIASTOLIC BLOOD PRESSURE: 76 MMHG | HEART RATE: 102 BPM | BODY MASS INDEX: 34.23 KG/M2

## 2024-08-30 DIAGNOSIS — M54.2 CERVICALGIA: ICD-10-CM

## 2024-08-30 DIAGNOSIS — G43.709 CHRONIC MIGRAINE W/OUT AURA, NOT INTRACTABLE, W/OUT STATUS MIGRAINOSUS: ICD-10-CM

## 2024-08-30 PROCEDURE — G2211 COMPLEX E/M VISIT ADD ON: CPT

## 2024-08-30 PROCEDURE — 99214 OFFICE O/P EST MOD 30 MIN: CPT

## 2024-08-30 NOTE — PHYSICAL EXAM
[General Appearance - In No Acute Distress] : in no acute distress [Affect] : the affect was normal [Mood] : the mood was normal [Person] : oriented to person [Place] : oriented to place [Time] : oriented to time [Short Term Intact] : short term memory intact [Current Events] : adequate knowledge of current events [Cranial Nerves Optic (II)] : visual acuity intact bilaterally,  visual fields full to confrontation, pupils equal round and reactive to light [Cranial Nerves Trigeminal (V)] : facial sensation intact symmetrically [Cranial Nerves Facial (VII)] : face symmetrical [Cranial Nerves Glossopharyngeal (IX)] : tongue and palate midline [Cranial Nerves Accessory (XI - Cranial And Spinal)] : head turning and shoulder shrug symmetric [Cranial Nerves Hypoglossal (XII)] : there was no tongue deviation with protrusion [Motor Strength] : muscle strength was normal in all four extremities [Motor Handedness Right-Handed] : the patient is right hand dominant [Abnormal Walk] : normal gait [Balance] : balance was intact [2+] : Patella left 2+ [Over the Past 2 Weeks, Have You Felt Down, Depressed, or Hopeless?] : 1.) Over the past 2 weeks, have you felt down, depressed, or hopeless? No [Over the Past 2 Weeks, Have You Felt Little Interest or Pleasure Doing Things?] : 2.) Over the past 2 weeks, have you felt little interest or pleasure doing things? No [Involuntary Movements] : no involuntary movements were seen [FreeTextEntry1] : b/l trap msk ttp, restricted rom in neck

## 2024-08-30 NOTE — ASSESSMENT
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 has had great response on Emgality   -PT for msk neck pain -Will continue Emgality monthly  Ubrevly prn for abortive discussed  use of medications and pregnancy.    phone contact in the interim. fov 6 mth

## 2024-08-30 NOTE — HISTORY OF PRESENT ILLNESS
[FreeTextEntry1] : **Interval 8.30.24: doing well on Emgality. HA are infrequent- under 5/month  neck pain is stable.   **Interval 5.24.24: got loading dose of EMgality. HAs went from daily to none. did not get the maintenance dose and HAs are back to daily. no changes in quality.  wants to retry. neck pain is daily. no radiation of pain    INITIAL 2.3.24 Ms. 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  Was previous seen by Salima Wright she reports since last seen she continues  to have HAs- has been more frequent, 4/5x/week. with various intensity.  same quality. a/w light sand sound sensitivity not on any preventive or any abortive. Tylenol not effective. no longer planning IVF   here for her annual scan, concerned as her frequency is more and had made lifestyle changes with no changes to her HA. She has tried and failed multiple medications and has restarted magnesium and vitamin B2 the past few weeks.  Meds tried: Topamax Ubrevly  mg ribo nortriptyline amitriptyline propranolol

## 2025-03-17 ENCOUNTER — APPOINTMENT (OUTPATIENT)
Dept: NEUROLOGY | Facility: CLINIC | Age: 35
End: 2025-03-17
Payer: COMMERCIAL

## 2025-03-17 VITALS
HEART RATE: 75 BPM | BODY MASS INDEX: 40.48 KG/M2 | WEIGHT: 220 LBS | DIASTOLIC BLOOD PRESSURE: 77 MMHG | OXYGEN SATURATION: 98 % | SYSTOLIC BLOOD PRESSURE: 120 MMHG | HEIGHT: 62 IN

## 2025-03-17 DIAGNOSIS — I67.1 CEREBRAL ANEURYSM, NONRUPTURED: ICD-10-CM

## 2025-03-17 DIAGNOSIS — G43.709 CHRONIC MIGRAINE W/OUT AURA, NOT INTRACTABLE, W/OUT STATUS MIGRAINOSUS: ICD-10-CM

## 2025-03-17 PROCEDURE — 99214 OFFICE O/P EST MOD 30 MIN: CPT

## 2025-08-28 ENCOUNTER — APPOINTMENT (OUTPATIENT)
Dept: MRI IMAGING | Facility: CLINIC | Age: 35
End: 2025-08-28

## 2025-08-28 ENCOUNTER — NON-APPOINTMENT (OUTPATIENT)
Age: 35
End: 2025-08-28

## 2025-08-28 ENCOUNTER — OUTPATIENT (OUTPATIENT)
Dept: OUTPATIENT SERVICES | Facility: HOSPITAL | Age: 35
LOS: 1 days | End: 2025-08-28
Payer: COMMERCIAL

## 2025-08-28 DIAGNOSIS — I67.1 CEREBRAL ANEURYSM, NONRUPTURED: ICD-10-CM

## 2025-08-28 DIAGNOSIS — Z98.890 OTHER SPECIFIED POSTPROCEDURAL STATES: Chronic | ICD-10-CM

## 2025-08-28 PROCEDURE — 70551 MRI BRAIN STEM W/O DYE: CPT | Mod: 26

## 2025-08-28 PROCEDURE — 70544 MR ANGIOGRAPHY HEAD W/O DYE: CPT

## 2025-08-28 PROCEDURE — 70544 MR ANGIOGRAPHY HEAD W/O DYE: CPT | Mod: 26,59

## 2025-08-28 PROCEDURE — 70551 MRI BRAIN STEM W/O DYE: CPT

## 2025-09-15 ENCOUNTER — APPOINTMENT (OUTPATIENT)
Dept: NEUROLOGY | Facility: CLINIC | Age: 35
End: 2025-09-15
Payer: COMMERCIAL

## 2025-09-15 ENCOUNTER — NON-APPOINTMENT (OUTPATIENT)
Age: 35
End: 2025-09-15

## 2025-09-15 DIAGNOSIS — G43.709 CHRONIC MIGRAINE W/OUT AURA, NOT INTRACTABLE, W/OUT STATUS MIGRAINOSUS: ICD-10-CM

## 2025-09-15 DIAGNOSIS — H81.90 UNSPECIFIED DISORDER OF VESTIBULAR FUNCTION, UNSPECIFIED EAR: ICD-10-CM

## 2025-09-15 PROCEDURE — 99213 OFFICE O/P EST LOW 20 MIN: CPT

## 2025-09-15 PROCEDURE — G2211 COMPLEX E/M VISIT ADD ON: CPT | Mod: NC

## 2025-09-16 ENCOUNTER — NON-APPOINTMENT (OUTPATIENT)
Age: 35
End: 2025-09-16

## 2025-09-16 ENCOUNTER — APPOINTMENT (OUTPATIENT)
Dept: PODIATRY | Facility: CLINIC | Age: 35
End: 2025-09-16
Payer: COMMERCIAL

## 2025-09-16 DIAGNOSIS — M79.674 PAIN IN RIGHT TOE(S): ICD-10-CM

## 2025-09-16 DIAGNOSIS — L60.0 INGROWING NAIL: ICD-10-CM

## 2025-09-16 DIAGNOSIS — M79.675 PAIN IN LEFT TOE(S): ICD-10-CM

## 2025-09-16 DIAGNOSIS — Q66.6 OTHER CONGENITAL VALGUS DEFORMITIES OF FEET: ICD-10-CM

## 2025-09-16 PROCEDURE — 99204 OFFICE O/P NEW MOD 45 MIN: CPT

## 2025-09-18 PROBLEM — Q66.6 VALGUS DEFORMITIES OF FEET, CONGENITAL: Status: ACTIVE | Noted: 2025-09-18

## 2025-09-18 PROBLEM — L60.0 INGROWN TOENAIL: Status: ACTIVE | Noted: 2025-09-18

## 2025-09-18 PROBLEM — M79.675 PAIN OF TOE OF LEFT FOOT: Status: ACTIVE | Noted: 2025-09-18

## 2025-09-18 PROBLEM — M79.674 PAIN OF TOE OF RIGHT FOOT: Status: ACTIVE | Noted: 2025-09-18
